# Patient Record
Sex: MALE | Race: WHITE
[De-identification: names, ages, dates, MRNs, and addresses within clinical notes are randomized per-mention and may not be internally consistent; named-entity substitution may affect disease eponyms.]

---

## 2018-05-12 ENCOUNTER — HOSPITAL ENCOUNTER (EMERGENCY)
Dept: HOSPITAL 62 - ER | Age: 33
Discharge: HOME | End: 2018-05-12
Payer: SELF-PAY

## 2018-05-12 VITALS — SYSTOLIC BLOOD PRESSURE: 121 MMHG | DIASTOLIC BLOOD PRESSURE: 65 MMHG

## 2018-05-12 DIAGNOSIS — R10.31: ICD-10-CM

## 2018-05-12 DIAGNOSIS — K42.9: ICD-10-CM

## 2018-05-12 DIAGNOSIS — J45.909: ICD-10-CM

## 2018-05-12 DIAGNOSIS — R19.7: ICD-10-CM

## 2018-05-12 DIAGNOSIS — R11.2: Primary | ICD-10-CM

## 2018-05-12 DIAGNOSIS — Z88.0: ICD-10-CM

## 2018-05-12 LAB
ABSOLUTE LYMPHOCYTES# (MANUAL): 1.1 10^3/UL (ref 0.5–4.7)
ABSOLUTE MONOCYTES # (MANUAL): 0.3 10^3/UL (ref 0.1–1.4)
ABSOLUTE NEUTROPHILS# (MANUAL): 14.2 10^3/UL (ref 1.7–8.2)
ADD MANUAL DIFF: YES
ALBUMIN SERPL-MCNC: 4.5 G/DL (ref 3.5–5)
ALP SERPL-CCNC: 97 U/L (ref 38–126)
ALT SERPL-CCNC: 78 U/L (ref 21–72)
ANION GAP SERPL CALC-SCNC: 16 MMOL/L (ref 5–19)
AST SERPL-CCNC: 36 U/L (ref 17–59)
BASOPHILS NFR BLD MANUAL: 0 % (ref 0–2)
BILIRUB DIRECT SERPL-MCNC: 0.4 MG/DL (ref 0–0.4)
BILIRUB SERPL-MCNC: 0.9 MG/DL (ref 0.2–1.3)
BUN SERPL-MCNC: 13 MG/DL (ref 7–20)
CALCIUM: 9.7 MG/DL (ref 8.4–10.2)
CHLORIDE SERPL-SCNC: 99 MMOL/L (ref 98–107)
CO2 SERPL-SCNC: 24 MMOL/L (ref 22–30)
EOSINOPHIL NFR BLD MANUAL: 0 % (ref 0–6)
ERYTHROCYTE [DISTWIDTH] IN BLOOD BY AUTOMATED COUNT: 13.5 % (ref 11.5–14)
GLUCOSE SERPL-MCNC: 193 MG/DL (ref 75–110)
HCT VFR BLD CALC: 45.7 % (ref 37.9–51)
HGB BLD-MCNC: 15.6 G/DL (ref 13.5–17)
LIPASE SERPL-CCNC: 59.5 U/L (ref 23–300)
MCH RBC QN AUTO: 28.8 PG (ref 27–33.4)
MCHC RBC AUTO-ENTMCNC: 34.1 G/DL (ref 32–36)
MCV RBC AUTO: 85 FL (ref 80–97)
MONOCYTES % (MANUAL): 2 % (ref 3–13)
PLATELET # BLD: 329 10^3/UL (ref 150–450)
PLATELET COMMENT: ADEQUATE
POTASSIUM SERPL-SCNC: 4.9 MMOL/L (ref 3.6–5)
PROT SERPL-MCNC: 7.6 G/DL (ref 6.3–8.2)
RBC # BLD AUTO: 5.39 10^6/UL (ref 4.35–5.55)
RBC MORPH BLD: (no result)
SEGMENTED NEUTROPHILS % (MAN): 91 % (ref 42–78)
SODIUM SERPL-SCNC: 138.9 MMOL/L (ref 137–145)
TOTAL CELLS COUNTED BLD: 100
VARIANT LYMPHS NFR BLD MANUAL: 7 % (ref 13–45)
WBC # BLD AUTO: 15.6 10^3/UL (ref 4–10.5)

## 2018-05-12 PROCEDURE — 96374 THER/PROPH/DIAG INJ IV PUSH: CPT

## 2018-05-12 PROCEDURE — 85025 COMPLETE CBC W/AUTO DIFF WBC: CPT

## 2018-05-12 PROCEDURE — 80053 COMPREHEN METABOLIC PANEL: CPT

## 2018-05-12 PROCEDURE — 83690 ASSAY OF LIPASE: CPT

## 2018-05-12 PROCEDURE — 99284 EMERGENCY DEPT VISIT MOD MDM: CPT

## 2018-05-12 PROCEDURE — 74177 CT ABD & PELVIS W/CONTRAST: CPT

## 2018-05-12 PROCEDURE — 36415 COLL VENOUS BLD VENIPUNCTURE: CPT

## 2018-05-12 NOTE — RADIOLOGY REPORT (SQ)
EXAM DESCRIPTION:  CT ABD/PELVIS WITH IV   ORAL



COMPLETED DATE/TIME:  5/12/2018 11:02 am



REASON FOR STUDY:  RLQ pain



COMPARISON:  None.



TECHNIQUE:  CT scan of the abdomen and pelvis performed using helical scanning technique with dynamic
 intravenous contrast injection.  Oral contrast given.  . Images reviewed with lung, soft tissue, and
 bone windows. Reconstructed coronal and sagittal MPR images reviewed. Delayed images for evaluation 
of the urinary system also acquired. All images stored on PACS.

All CT scanners at this facility use dose modulation, iterative reconstruction, and/or weight based d
osing when appropriate to reduce radiation dose to as low as reasonably achievable (ALARA).

[facility]: Dose Right  [facility]: CareDose    [facility]: Dose Right    [facility]: Teradose 4D    [facility]: Smart Technologies



CONTRAST TYPE AND DOSE:  Isovue 370.  100 mL.



RENAL FUNCTION:  Not available.



RADIATION DOSE:  2595.6



LIMITATIONS:  None.



FINDINGS:  LOWER CHEST: No significant findings. No nodules or infiltrates.

LIVER: Fatty infiltration of the liver.

SPLEEN: No abnormality seen.

PANCREAS: No abnormality seen.

GALLBLADDER: No abnormality seen.

ADRENAL GLANDS: No abnormality seen.

RIGHT KIDNEY AND URETER: No abnormality seen.

LEFT KIDNEY AND URETER: No abnormality seen.

AORTA AND VESSELS: No aneurysm. No dissection. Renal arteries, SMA, celiac without stenosis.

RETROPERITONEUM: No retroperitoneal adenopathy, hemorrhage or masses.

BOWEL AND PERITONEAL CAVITY: No masses or inflammatory changes. No free fluid or peritoneal masses.

APPENDIX: Normal.

PELVIS: Urinary bladder:  No abnormality seen.  Prostate and seminal vesicles:  No abnormality seen.

ABDOMINAL WALL: Umbilical hernia containing fat.

BONES:  At L4-5 ,there is a broad-based circumferential bulging disc with posterior central disc prot
rusion.



IMPRESSION:  FATTY INFILTRATION OF THE LIVER.  OTHERWISE, NO SIGNIFICANT ABNORMALITY.



TECHNICAL DOCUMENTATION:  JOB ID:  [id number]

[id number]

Quality ID # [id number]: Final reports with documentation of one or more dose reduction techniques (e.g., Au
tomated exposure control, adjustment of the mA and/or kV according to patient size, use of iterative 
reconstruction technique)

 2011 [employer]- All Rights Reserved



Reading location - IP/workstation name: [email or web address]

## 2018-05-12 NOTE — ER DOCUMENT REPORT
ED GI/





- General


Chief Complaint: Vomiting


Stated Complaint: NAUSEA WITH ABDOMINAL PAIN


Time Seen by Provider: 05/12/18 07:25


Notes: 





Patient is a 33-year-old male who presents emergency department the chief 

complaint of right lower quadrant pain with associated nausea, vomiting and 

diarrhea.  Patient states he was sleeping when he was awoken with right lower 

quadrant pain.  Patient states that his last p.o. intake was 1030 last evening.

  Patient denies any previous history of Crohn's disease, ulcerative colitis, 

irritable bowel disease.  Patient states that he does commonly of constipation 

which is not any problem for him.  He denies any bright red blood per rectum, 

melena, hematochezia, hematemesis.  Patient's primary care is with Dr. Foley.  Patient does have a past medical history significant for asthma





- Related Data


Allergies/Adverse Reactions: 


 





amoxicillin Allergy (Verified 05/12/18 07:24)


 











Past Medical History





- Social History


Smoking Status: Never Smoker


Family History: Reviewed & Not Pertinent





Review of Systems





- Review of Systems


Constitutional: No symptoms reported


Cardiovascular: No symptoms reported


Respiratory: No symptoms reported


Gastrointestinal: See HPI


Genitourinary: No symptoms reported


Musculoskeletal: No symptoms reported


-: Yes All other systems reviewed and negative





Physical Exam





- Vital signs


Vitals: 


 











Temp Pulse Resp BP Pulse Ox


 


 99.5 F   115 H  18   134/79 H  95 


 


 05/12/18 06:13  05/12/18 06:13  05/12/18 06:13  05/12/18 06:13  05/12/18 06:13














- Notes


Notes: 





PHYSICAL EXAM


GENERAL: Alert, interacts well. 


HEAD: Normocephalic, atraumatic.


EYES: Pupils equal, round, and reactive to light. Extraocular movements intact.


ENT: Oral mucosa moist, tongue midline. 


NECK: Full range of motion. Supple. Trachea midline.


LUNGS: Clear to auscultation bilaterally, no wheezes, rales, or rhonchi. No 

respiratory distress.


HEART: Regular rate and rhythm. No murmurs, gallops, or rubs.


ABDOMEN: Soft,  nondistended, right lower quadrant tenderness without McBurney 

point tenderness. No guarding, rebound, or rigidity.. Bowel sounds present in 

all 4 quadrants.


EXTREMITIES: Moves all 4 extremities spontaneously. No edema, No cyanosis. 


NEUROLOGICAL: Alert and oriented x4. Normal speech.


PSYCH: Normal affect, normal mood.


SKIN: Warm, dry, normal turgor. No rashes or lesions noted.








Course





- Re-evaluation


Re-evalutation: 





05/12/18 12:07


Presentation of an overall well-appearing patient in no acute distress with 

complaints of nausea, vomiting, diarrhea.  This is consistent with likely viral 

gastroenteritis.  Repeat serial abdominal exams improved and now no focal 

tenderness  Overall well hydrated on exam.  Able to tolerate oral intake here 

in the emergency department. Low clinical suspicion for any acute life-

threatening etiology based on exam and history including acute cholecystitis, 

SBO, appendicitis, nephrolithiasis, or pylonephritis. CMP without evidence of 

acute hepatitis or significant dehydration. CT negative for any underlying 

disease. Small fat containing umbilical hernia that is onpapable on exam, no 

concern for incarceration.  Will plan for discharge at this time with return 

precautions and followup recommendations.





- Vital Signs


Vital signs: 


 











Temp Pulse Resp BP Pulse Ox


 


 97.8 F   115 H  16   138/79 H  97 


 


 05/12/18 08:03  05/12/18 08:03  05/12/18 08:03  05/12/18 08:03  05/12/18 08:03














- Laboratory


Result Diagrams: 


 05/12/18 07:36





 05/12/18 07:36


Laboratory results interpreted by me: 


 











  05/12/18 05/12/18





  07:36 07:36


 


WBC  15.6 H 


 


Seg Neuts % (Manual)  91 H 


 


Lymphocytes % (Manual)  7 L 


 


Monocytes % (Manual)  2 L 


 


Abs Neuts (Manual)  14.2 H 


 


Glucose   193 H


 


ALT   78 H














- Diagnostic Test


Radiology reviewed: Image reviewed, Reports reviewed





Discharge





- Discharge


Clinical Impression: 


 Abdominal pain, Nausea & vomiting





Condition: Good


Disposition: HOME, SELF-CARE


Additional Instructions: 


ABDOMINAL PAIN:





     There are many causes of abdominal pain.  Pain can mean a serious problem 

requiring surgery (such as appendicitis). It can also be an innocent problem 

that goes away on its own (such as a viral infection).  Often, time must pass 

to determine the cause of pain.


     The physician does not feel that hospitalization is necessary, at present. 

Things may change within the next 24 hours. Call the doctor or come back for re-

examination if any problems occur, such as:


     (1) Pain that becomes more severe, steady, or becomes concentrated in one 

specific area.  Also, pain that is more severe with movement or coughing.  


     (2) Vomiting that persists or becomes more frequent.  


     (3) Blood in the vomitus, urine, or bowel movements.  Blood in the stool 

may have a tarry or black appearance.


     (4) Shaking chills or fever greater than 100 degrees F. 


     (5) The abdomen becomes more distended or swollen. 


     (6) Bowel movements cease. 


     (7) Failure to improve as expected.








NORMAL EXAM AND WORKUP:


     At this time, your examination and workup show no significant abnormality.

  No significant abnormal physical findings are noted.  All laboratory, EKG, 

and imaging (x-ray, CT scans, ultrasound) studies that were ordered show no 

significant abnormality.


     Although your examination and all studies that were ordered showed no 

significant abnormal finding, there are no examinations and no studies that are 

100% accurate.  There is always the possibility that some abnormality could 

exist and not be detected with physical examination or within the limits and 

capabilities of laboratory and other studies.


     You should return or follow up as you were instructed on your visit today 

for further evaluation if your symptoms do not resolve.








[place] MEDICATION:


     You have been given a medication to suppress nausea and vomiting. This 

type of medication can be given as a shot, pill, or suppository. It will 

usually last for many hours.  Pills and shots usually last six to eight hours, 

suppositories last about 12 hours.  For the typical illness, only one or two 

doses of the medication may be necessary.


     Mild lightheadedness may occur.  This type of medicine can cause 

drowsiness.  Do not drive or operate dangerous machinery while under its 

influence.  Do not mix with alcohol.


     See your doctor at once if you have muscle spasms or tightness, or 

uncontrollable motions (particularly of the neck, mouth, or jaw). Persistent 

vomiting or severe lightheadedness should also be evaluated by the physician.








FOLLOW-UP CARE:


If you have been referred to a physician for follow-up care, call the physician

s office for an appointment as you were instructed or within the next two days.

  If you experience worsening or a significant change in your symptoms, notify 

the physician immediately or return to the Emergency Department at any time for 

re-evaluation.





Prescriptions: 


Ondansetron [Zofran Odt 4 mg Tablet] 1 - 2 tab PO Q4H PRN #15 tab.rapdis


 PRN Reason: For Nausea/Vomiting


Referrals: 


DESIREE FOLEY MD [Primary Care Provider] - Follow up in 3-5 days

## 2020-03-12 ENCOUNTER — HOSPITAL ENCOUNTER (EMERGENCY)
Dept: HOSPITAL 62 - ER | Age: 35
Discharge: HOME | End: 2020-03-12
Payer: SELF-PAY

## 2020-03-12 VITALS — SYSTOLIC BLOOD PRESSURE: 119 MMHG | DIASTOLIC BLOOD PRESSURE: 83 MMHG

## 2020-03-12 DIAGNOSIS — K62.5: Primary | ICD-10-CM

## 2020-03-12 DIAGNOSIS — R73.9: ICD-10-CM

## 2020-03-12 DIAGNOSIS — R42: ICD-10-CM

## 2020-03-12 DIAGNOSIS — K64.9: ICD-10-CM

## 2020-03-12 LAB
ADD MANUAL DIFF: NO
ALBUMIN SERPL-MCNC: 4.4 G/DL (ref 3.5–5)
ALP SERPL-CCNC: 92 U/L (ref 38–126)
ANION GAP SERPL CALC-SCNC: 12 MMOL/L (ref 5–19)
AST SERPL-CCNC: 39 U/L (ref 17–59)
BASOPHILS # BLD AUTO: 0.1 10^3/UL (ref 0–0.2)
BASOPHILS NFR BLD AUTO: 0.7 % (ref 0–2)
BILIRUB DIRECT SERPL-MCNC: 0.2 MG/DL (ref 0–0.4)
BILIRUB SERPL-MCNC: 1.2 MG/DL (ref 0.2–1.3)
BUN SERPL-MCNC: 9 MG/DL (ref 7–20)
CALCIUM: 9.1 MG/DL (ref 8.4–10.2)
CHLORIDE SERPL-SCNC: 95 MMOL/L (ref 98–107)
CO2 SERPL-SCNC: 26 MMOL/L (ref 22–30)
EOSINOPHIL # BLD AUTO: 0 10^3/UL (ref 0–0.6)
EOSINOPHIL NFR BLD AUTO: 0.1 % (ref 0–6)
ERYTHROCYTE [DISTWIDTH] IN BLOOD BY AUTOMATED COUNT: 13.8 % (ref 11.5–14)
GLUCOSE SERPL-MCNC: 275 MG/DL (ref 75–110)
HCT VFR BLD CALC: 45.4 % (ref 37.9–51)
HGB BLD-MCNC: 15.8 G/DL (ref 13.5–17)
INR PPP: 1.04
LYMPHOCYTES # BLD AUTO: 1.9 10^3/UL (ref 0.5–4.7)
LYMPHOCYTES NFR BLD AUTO: 14.6 % (ref 13–45)
MCH RBC QN AUTO: 29.1 PG (ref 27–33.4)
MCHC RBC AUTO-ENTMCNC: 34.7 G/DL (ref 32–36)
MCV RBC AUTO: 84 FL (ref 80–97)
MONOCYTES # BLD AUTO: 1 10^3/UL (ref 0.1–1.4)
MONOCYTES NFR BLD AUTO: 7.6 % (ref 3–13)
NEUTROPHILS # BLD AUTO: 9.8 10^3/UL (ref 1.7–8.2)
NEUTS SEG NFR BLD AUTO: 77 % (ref 42–78)
PLATELET # BLD: 321 10^3/UL (ref 150–450)
POTASSIUM SERPL-SCNC: 3.8 MMOL/L (ref 3.6–5)
PROT SERPL-MCNC: 8.2 G/DL (ref 6.3–8.2)
PROTHROMBIN TIME: 13.7 SEC (ref 11.4–15.4)
RBC # BLD AUTO: 5.42 10^6/UL (ref 4.35–5.55)
TOTAL CELLS COUNTED % (AUTO): 100 %
WBC # BLD AUTO: 12.7 10^3/UL (ref 4–10.5)

## 2020-03-12 PROCEDURE — 85610 PROTHROMBIN TIME: CPT

## 2020-03-12 PROCEDURE — 80053 COMPREHEN METABOLIC PANEL: CPT

## 2020-03-12 PROCEDURE — 82270 OCCULT BLOOD FECES: CPT

## 2020-03-12 PROCEDURE — 36415 COLL VENOUS BLD VENIPUNCTURE: CPT

## 2020-03-12 PROCEDURE — 99283 EMERGENCY DEPT VISIT LOW MDM: CPT

## 2020-03-12 PROCEDURE — 85025 COMPLETE CBC W/AUTO DIFF WBC: CPT

## 2020-03-12 NOTE — ER DOCUMENT REPORT
ED GI Bleed / Rectal Pain





- General


Chief Complaint: Rectal Bleeding


Stated Complaint: RECTAL BLEEDING


Time Seen by Provider: 03/12/20 08:31


Primary Care Provider: 


DESIREE FOLEY MD [Primary Care Provider] - Follow up as needed


Notes: 





CHIEF COMPLAINT: Rectal bleeding yesterday with bowel movement





HPI: 34-year-old male presenting to the emergency department complaining of 

seeing blood when he wiped after a bowel movement yesterday.  Denies abdominal 

pain.  Reports feeling slightly lightheaded.  States the blood on the tissue was

bright red.  No clots.  States this happened twice yesterday when he wiped.  Did

not see blood in the toilet.  Did not have a bowel movement today and did not 

notice any blood at the rectum today.  States he has had this happen before did 

not have it evaluated.





ROS: See HPI - all other systems were reviewed and are otherwise negative


Constitutional: no fever 


Eyes: no drainage, no blurred vision


ENT: no runny nose, no sore throat


Cardiovascular:  no chest pain 


Resp: no SOB, no cough


GI: no vomiting, no diarrhea, no abdominal pain.  Positive blood from rectum


: no dysuria


Integumentary: no rash 


Allergy: no hives 


Musculoskeletal: no extremity pain or swelling 


Neurological: no numbness/tingling, no weakness





MEDICATIONS: I agree with the patient medications as charted by the RN.





ALLERGIES: I agree with the allergies as charted by the RN.





PAST MEDICAL HISTORY/PAST SURGICAL HISTORY: Reviewed and agree as charted by RN.





SOCIAL HISTORY: Reviewed and agree as charted by RN.





FAMILY HISTORY: No significant familial comorbid conditions directly related to 

patient complaint





EXAM:


Reviewed vital signs as charted by RN.


CONSTITUTIONAL: Alert and oriented and responds appropriately to questions. 

Well-appearing; well-nourished


HEAD: Normocephalic; atraumatic


EYES: PERRL; Conjunctivae clear, sclerae non-icteric


ENT: normal nose; no rhinorrhea; moist mucous membranes; pharynx without lesions

noted, no uvula edema or deviation, no tonsillar hypertrophy, phonation normal


NECK: Supple without meningismus; non-tender; no cervical lymphadenopathy, no 

masses


CARD: RRR; no murmurs, no clicks, no rubs, no gallops; symmetric distal pulses


RESP: Normal chest excursion without splinting or tachypnea; breath sounds clear

and equal bilaterally; no wheezes, no rhonchi, no rales, pulse oximetry 


ABD/GI: Normal bowel sounds; non-distended; soft, non-tender, no rebound, no 

guarding; no palpable organomegaly or masses.


Rectal: There is no visible blood at the rectal opening.  There is a small 

nonthrombosed hemorrhoid at the rectal opening at approximately 5 PM.  There is 

slight bright red blood noted on the inner aspect of the rectum, Hemoccult 

positive


BACK:  The back appears normal and is non-tender to palpation, there is no CVA 

tenderness


EXT: Normal ROM in all joints; non-tender to palpation; no cyanosis, no 

effusions, no edema   


SKIN: Normal color for age and race; warm; dry; good turgor; no acute lesions 

noted


NEURO: Moves all extremities equally; Motor and sensory function intact 


PSYCH: The patient's mood and manner are appropriate. Grooming and personal 

hygiene are appropriate.





MDM: 34-year-old male presenting with blood per rectum when wiping after a bowel

movement yesterday does report history of constipation.  Has no abdominal pain 

on exam.  Will obtain basic screening labs, orthostatics.  I suspect this is 

likely hemorrhoidal


TRAVEL OUTSIDE OF THE [place] IN LAST 30 DAYS: No





- Related Data


Allergies/Adverse Reactions: 


                                        





amoxicillin Allergy (Verified 03/12/20 07:57)


   











Past Medical History





- Social History


Smoking Status: Never Smoker


Chew tobacco use (# tins/day): No


Frequency of alcohol use: None


Drug Abuse: None


Family History: Reviewed & Not Pertinent


Patient has suicidal ideation: No


Patient has homicidal ideation: No


Renal/ Medical History: Denies: Hx Peritoneal Dialysis





Physical Exam





- Vital signs


Vitals: 


                                        











Temp Pulse Resp BP Pulse Ox


 


 99.9 F   105 H  20   122/72   96 


 


 03/12/20 06:35  03/12/20 06:35  03/12/20 06:35  03/12/20 06:35  03/12/20 06:35














Course





- Re-evaluation


Re-evalutation: 





03/12/20 10:29


Patient's blood glucose is elevated at 275, he is likely diabetic explaining why

he does not feel well normally.  Will start patient on metformin, discussed with

Dr. Del Rosario attending.  Patient states he does have a PCP for follow-up we 

discussed the importance of following up given the possibility of diabetes.  The

rectal bleeding suggests hemorrhoidal nature, will have him follow this up also 

with his PCP


03/12/20 11:06


Orthostatics are negative





- Vital Signs


Vital signs: 


                                        











Temp Pulse Resp BP Pulse Ox


 


 99.9 F   79   20   116/70   96 


 


 03/12/20 06:35  03/12/20 10:48  03/12/20 06:35  03/12/20 10:48  03/12/20 06:35














- Laboratory


Result Diagrams: 


                                 03/12/20 08:40





                                 03/12/20 08:40


Laboratory results interpreted by me: 


                                        











  03/12/20 03/12/20





  08:40 08:40


 


WBC  12.7 H 


 


Absolute Neuts (auto)  9.8 H 


 


Sodium   133.0 L


 


Chloride   95 L


 


Glucose   275 H


 


ALT   60 H














Discharge





- Discharge


Clinical Impression: 


 Rectal bleeding, Hyperglycemia





Hemorrhoid


Qualifiers:


 Hemorrhoid type: unspecified Qualified Code(s): K64.9 - Unspecified hemorrhoids





Condition: Stable


Disposition: HOME, SELF-CARE


Instructions:  Hyperglycemia ([facility])


Additional Instructions: 


Take the metformin as prescribed.  Follow-up with your primary care provider in 

1 to 2 days for recheck and reevaluation is important that you discussed with 

him the need to obtain a blood glucose monitor to monitor your blood sugars.  It

is important to discuss referrals for dietitian, weight loss.  Use the Anusol 

for the likely hemorrhoidal bleeding and have your PCP recheck this as well


Prescriptions: 


Hydrocortisone Acetate [Anusol Hc 25 mg Supp.rect] 1 supp.rect DC BID #14 

supp.rect


Metformin HCl [Glucophage 500 mg Tablet] 500 mg PO BID #60 tablet


Referrals: 


DESIREE FOLEY MD [Primary Care Provider] - Follow up as needed